# Patient Record
Sex: FEMALE | Race: WHITE | NOT HISPANIC OR LATINO | Employment: PART TIME | URBAN - METROPOLITAN AREA
[De-identification: names, ages, dates, MRNs, and addresses within clinical notes are randomized per-mention and may not be internally consistent; named-entity substitution may affect disease eponyms.]

---

## 2017-11-24 ENCOUNTER — APPOINTMENT (OUTPATIENT)
Dept: GENERAL RADIOLOGY | Facility: HOSPITAL | Age: 22
End: 2017-11-24

## 2017-11-24 PROCEDURE — 71020 HC CHEST PA AND LATERAL: CPT | Performed by: EMERGENCY MEDICINE

## 2020-08-04 ENCOUNTER — OFFICE VISIT (OUTPATIENT)
Dept: FAMILY MEDICINE CLINIC | Facility: CLINIC | Age: 25
End: 2020-08-04

## 2020-08-04 VITALS
WEIGHT: 124.8 LBS | HEIGHT: 67 IN | TEMPERATURE: 98.4 F | HEART RATE: 67 BPM | BODY MASS INDEX: 19.59 KG/M2 | SYSTOLIC BLOOD PRESSURE: 110 MMHG | OXYGEN SATURATION: 99 % | RESPIRATION RATE: 18 BRPM | DIASTOLIC BLOOD PRESSURE: 60 MMHG

## 2020-08-04 DIAGNOSIS — R25.3 FASCICULATIONS: ICD-10-CM

## 2020-08-04 DIAGNOSIS — R53.83 FATIGUE, UNSPECIFIED TYPE: Primary | ICD-10-CM

## 2020-08-04 LAB
APPEARANCE UR: CLEAR
BILIRUB UR QL STRIP: NEGATIVE
COLOR UR: YELLOW
GLUCOSE UR QL: NEGATIVE
HGB UR QL STRIP: NEGATIVE
KETONES UR QL STRIP: NEGATIVE
LEUKOCYTE ESTERASE UR QL STRIP: NEGATIVE
NITRITE UR QL STRIP: NEGATIVE
PH UR STRIP: 8 [PH] (ref 5–8)
PROT UR QL STRIP: NEGATIVE
SP GR UR: 1.01 (ref 1–1.03)
UROBILINOGEN UR STRIP-MCNC: NORMAL MG/DL

## 2020-08-04 PROCEDURE — 99204 OFFICE O/P NEW MOD 45 MIN: CPT | Performed by: INTERNAL MEDICINE

## 2020-08-04 NOTE — PROGRESS NOTES
Subjective   No Perez is a 25 y.o. female. Patient is here today for   Chief Complaint   Patient presents with   • Establish Care   • Spasms     ongoing for several weeks. and then intermittent ongoing for 6 months.           Vitals:    08/04/20 0913   BP: 110/60   Pulse: 67   Resp: 18   Temp: 98.4 °F (36.9 °C)   SpO2: 99%     Body mass index is 19.55 kg/m².      Past Medical History:   Diagnosis Date   • Aorta coarctation       No Known Allergies   Social History     Socioeconomic History   • Marital status:      Spouse name: Not on file   • Number of children: Not on file   • Years of education: Not on file   • Highest education level: Not on file   Tobacco Use   • Smoking status: Never Smoker   • Smokeless tobacco: Never Used   Substance and Sexual Activity   • Alcohol use: No   • Drug use: Never        Current Outpatient Medications:   •  CHOLECALCIFEROL PO, Take  by mouth Daily., Disp: , Rfl:   •  levonorgestrel (MIRENA) 20 MCG/24HR IUD, 1 each by Intrauterine route., Disp: , Rfl:   •  MAGNESIUM-POTASSIUM PO, Take  by mouth Daily., Disp: , Rfl:   •  OMEGA-3 FATTY ACIDS PO, Take  by mouth Daily., Disp: , Rfl:   •  PROBIOTIC PRODUCT PO, Take  by mouth Daily., Disp: , Rfl:   •  TURMERIC PO, Take  by mouth Daily., Disp: , Rfl:   •  cefuroxime (CEFTIN) 500 MG tablet, Take 1 tablet by mouth 2 (Two) Times a Day., Disp: 20 tablet, Rfl: 0  •  guaifenesin-codeine (GUAIFENESIN AC) 100-10 MG/5ML liquid, Take 5 mL by mouth 4 (Four) Times a Day As Needed for Cough., Disp: 100 mL, Rfl: 0     Objective     This pleasant 25-year-old mother of 2 is here to meet me for the first time.    Up until recently, she continued to follow-up with her pediatrician for her health care and her gynecologist of course.    She tells me that she has had occasional fasciculations at time affecting the temple, the eyelids, both arms both legs.  This is been going on occasionally over the last 6 months or so.    She denies undergoing any  novel goal physical exertion.    She does not ingest any caffeine.    She has a past history of a coarctation of the aorta.  This was repaired shortly after she was born.    She follows up with cardiology who arranged for her to have a MRI of her head about 4 to 5 years ago.  That was negative (screen was done to rule out any aneurysms in light of her history of coarctation of the aorta.    She does not smoke cigarettes.  She drinks alcohol very infrequently.    She has a prior history of a mild thrombocytopenia.    She is employed as a .       Review of Systems   Constitutional: Positive for fatigue.   HENT: Negative.    Respiratory: Negative.    Cardiovascular: Negative.    Gastrointestinal: Negative.    Genitourinary: Negative.    Musculoskeletal: Negative.    Skin: Negative.    Neurological: Positive for tremors and weakness. Negative for headaches.   Hematological: Negative.    Psychiatric/Behavioral: Negative.        Physical Exam   Constitutional: She is oriented to person, place, and time. She appears well-developed and well-nourished.   Pleasant, neatly groomed, in no distress.   HENT:   Head: Normocephalic and atraumatic.   Pulmonary/Chest: Effort normal and breath sounds normal. No stridor. No respiratory distress. She has no wheezes.   Abdominal: Soft. Bowel sounds are normal.   Neurological: She is alert and oriented to person, place, and time. She displays normal reflexes. No cranial nerve deficit. Coordination normal.   Psychiatric: She has a normal mood and affect. Her behavior is normal. Thought content normal.   Nursing note and vitals reviewed.        Problem List Items Addressed This Visit        Musculoskeletal and Integument    Fasciculations    Relevant Orders    CBC & Differential    CK    JANETTE    Rheumatoid Factor, Quant    Sedimentation Rate       Other    Fatigue - Primary    Relevant Orders    Comprehensive Metabolic Panel    Vitamin B12    Urinalysis With Microscopic If  Indicated (No Culture) - Urine, Clean Catch    Iron Profile            PLAN  The fasciculations that she having is very concerning to her.  Her pediatrician check some labs which were unremarkable.  Her gynecologist check some labs which likewise were unremarkable.      She has done some Internet searching and she has a friend with multiple sclerosis.  She is concerned of course that that is a possibility.  I told her I do not think that is likely.    She had an MRI of her head to follow-up on the potential for intracerebral aneurysms in light of her history of coarctation of the aorta.  None were found.    I am going to arrange for her to get some labs.  If her lab work is unrevealing, I will probably be referring her to see [facility] to get their opinion on her symptoms.    No follow-ups on file.

## 2020-08-05 LAB
ALBUMIN SERPL-MCNC: 4.9 G/DL (ref 3.5–5.2)
ALBUMIN/GLOB SERPL: 2.3 G/DL
ALP SERPL-CCNC: 34 U/L (ref 39–117)
ALT SERPL-CCNC: 11 U/L (ref 1–33)
AST SERPL-CCNC: 17 U/L (ref 1–32)
BASOPHILS # BLD AUTO: 0.02 10*3/MM3 (ref 0–0.2)
BASOPHILS NFR BLD AUTO: 0.3 % (ref 0–1.5)
BILIRUB SERPL-MCNC: 0.8 MG/DL (ref 0–1.2)
BUN SERPL-MCNC: 11 MG/DL (ref 6–20)
BUN/CREAT SERPL: 14.7 (ref 7–25)
CALCIUM SERPL-MCNC: 9.4 MG/DL (ref 8.6–10.5)
CHLORIDE SERPL-SCNC: 103 MMOL/L (ref 98–107)
CK SERPL-CCNC: 93 U/L (ref 20–180)
CO2 SERPL-SCNC: 26.1 MMOL/L (ref 22–29)
CREAT SERPL-MCNC: 0.75 MG/DL (ref 0.57–1)
EOSINOPHIL # BLD AUTO: 0.02 10*3/MM3 (ref 0–0.4)
EOSINOPHIL NFR BLD AUTO: 0.3 % (ref 0.3–6.2)
ERYTHROCYTE [DISTWIDTH] IN BLOOD BY AUTOMATED COUNT: 11.4 % (ref 12.3–15.4)
GLOBULIN SER CALC-MCNC: 2.1 GM/DL
GLUCOSE SERPL-MCNC: 85 MG/DL (ref 65–99)
HCT VFR BLD AUTO: 43.3 % (ref 34–46.6)
HGB BLD-MCNC: 14.3 G/DL (ref 12–15.9)
IMM GRANULOCYTES # BLD AUTO: 0.03 10*3/MM3 (ref 0–0.05)
IMM GRANULOCYTES NFR BLD AUTO: 0.4 % (ref 0–0.5)
IRON SATN MFR SERPL: 51 % (ref 20–50)
IRON SERPL-MCNC: 143 MCG/DL (ref 37–145)
LYMPHOCYTES # BLD AUTO: 1.65 10*3/MM3 (ref 0.7–3.1)
LYMPHOCYTES NFR BLD AUTO: 23.8 % (ref 19.6–45.3)
MCH RBC QN AUTO: 31.8 PG (ref 26.6–33)
MCHC RBC AUTO-ENTMCNC: 33 G/DL (ref 31.5–35.7)
MCV RBC AUTO: 96.4 FL (ref 79–97)
MONOCYTES # BLD AUTO: 0.36 10*3/MM3 (ref 0.1–0.9)
MONOCYTES NFR BLD AUTO: 5.2 % (ref 5–12)
NEUTROPHILS # BLD AUTO: 4.86 10*3/MM3 (ref 1.7–7)
NEUTROPHILS NFR BLD AUTO: 70 % (ref 42.7–76)
NRBC BLD AUTO-RTO: 0 /100 WBC (ref 0–0.2)
PLATELET # BLD AUTO: 130 10*3/MM3 (ref 140–450)
POTASSIUM SERPL-SCNC: 4.8 MMOL/L (ref 3.5–5.2)
PROT SERPL-MCNC: 7 G/DL (ref 6–8.5)
RBC # BLD AUTO: 4.49 10*6/MM3 (ref 3.77–5.28)
SODIUM SERPL-SCNC: 139 MMOL/L (ref 136–145)
TIBC SERPL-MCNC: 280 MCG/DL
UIBC SERPL-MCNC: 137 MCG/DL (ref 112–346)
VIT B12 SERPL-MCNC: 537 PG/ML (ref 211–946)
WBC # BLD AUTO: 6.94 10*3/MM3 (ref 3.4–10.8)

## 2020-08-21 ENCOUNTER — TELEPHONE (OUTPATIENT)
Dept: FAMILY MEDICINE CLINIC | Facility: CLINIC | Age: 25
End: 2020-08-21

## 2020-08-21 NOTE — TELEPHONE ENCOUNTER
Pt needs to schedule appointment to discuss results with Dr.  It can be telephone or in office. Thank you.

## 2020-08-21 NOTE — TELEPHONE ENCOUNTER
"PATIENT WAS CALLING TO SEE IF THE NURSE WOULD CALL HER REGARDING HER LAB RESULTS.     PATIENT SAW HER RESULTS ON MY CHART BUT WANTED TO DISCUSS THEM WITH THE NURSE SINCE SHE NOTICED A FEW WERE OUT OF THE \"NORMAL\" RANGE AND SHE IS NOT SURE WHAT ANY OF THE LEVELS MEAN.     PATIENT CALL BACK NUMBER [phone]   "

## 2020-09-02 ENCOUNTER — OFFICE VISIT (OUTPATIENT)
Dept: FAMILY MEDICINE CLINIC | Facility: CLINIC | Age: 25
End: 2020-09-02

## 2020-09-02 DIAGNOSIS — R53.83 FATIGUE, UNSPECIFIED TYPE: Primary | ICD-10-CM

## 2020-09-02 PROCEDURE — 99441 PR PHYS/QHP TELEPHONE EVALUATION 5-10 MIN: CPT | Performed by: INTERNAL MEDICINE

## 2020-09-04 NOTE — PROGRESS NOTES
Subjective   No Perez is a 25 y.o. female.     History of Present Illness    This is documentation for a telephone visit.    The patient consented to the visit.    She had labs obtained in our office by me for complaints of fatigue.    She tells me that her fatigue is relatively well controlled/resolved now.        Review of Systems  No complaints.  Objective   Physical Exam  Physical exam was not performed secondary to tele-visit.  Assessment/Plan   No was seen today for fatigue.    Diagnoses and all orders for this visit:    Fatigue, unspecified type    The patient's lab work is unrevealing regarding her complaint of fatigue which she says has resolved.    I asked her to let me know if she should have recurrence of the symptoms or other symptoms.    This visit lasted less than 10 minutes.

## 2021-04-16 ENCOUNTER — BULK ORDERING (OUTPATIENT)
Dept: CASE MANAGEMENT | Facility: OTHER | Age: 26
End: 2021-04-16

## 2021-04-16 DIAGNOSIS — Z23 IMMUNIZATION DUE: ICD-10-CM
